# Patient Record
Sex: FEMALE | URBAN - METROPOLITAN AREA
[De-identification: names, ages, dates, MRNs, and addresses within clinical notes are randomized per-mention and may not be internally consistent; named-entity substitution may affect disease eponyms.]

---

## 2023-10-03 ENCOUNTER — APPOINTMENT (OUTPATIENT)
Dept: URBAN - METROPOLITAN AREA CLINIC 197 | Age: 48
Setting detail: DERMATOLOGY
End: 2023-10-05

## 2023-10-03 DIAGNOSIS — L40.0 PSORIASIS VULGARIS: ICD-10-CM

## 2023-10-03 PROCEDURE — OTHER PRESCRIPTION MEDICATION MANAGEMENT: OTHER

## 2023-10-03 PROCEDURE — OTHER DERMATOPHYTE TEST MEDIUM (DTM): OTHER

## 2023-10-03 PROCEDURE — 87101 SKIN FUNGI CULTURE: CPT

## 2023-10-03 PROCEDURE — 99214 OFFICE O/P EST MOD 30 MIN: CPT

## 2023-10-03 PROCEDURE — OTHER PRESCRIPTION: OTHER

## 2023-10-03 PROCEDURE — OTHER MEDICATION COUNSELING: OTHER

## 2023-10-03 PROCEDURE — OTHER MIPS QUALITY: OTHER

## 2023-10-03 PROCEDURE — OTHER COUNSELING: OTHER

## 2023-10-03 RX ORDER — PREDNISONE 10 MG/1
TABLET ORAL
Qty: 60 | Refills: 0 | Status: ERX | COMMUNITY
Start: 2023-10-03

## 2023-10-03 RX ORDER — FLUCONAZOLE 200 MG/1
TABLET ORAL
Qty: 6 | Refills: 0 | Status: ERX | COMMUNITY
Start: 2023-10-03

## 2023-10-03 ASSESSMENT — LOCATION SIMPLE DESCRIPTION DERM
LOCATION SIMPLE: ABDOMEN
LOCATION SIMPLE: LEFT BREAST

## 2023-10-03 ASSESSMENT — LOCATION DETAILED DESCRIPTION DERM
LOCATION DETAILED: LEFT INFRAMAMMARY CREASE (INNER QUADRANT)
LOCATION DETAILED: PERIUMBILICAL SKIN
LOCATION DETAILED: RIGHT RIB CAGE

## 2023-10-03 ASSESSMENT — LOCATION ZONE DERM: LOCATION ZONE: TRUNK

## 2023-10-03 NOTE — PROCEDURE: PRESCRIPTION MEDICATION MANAGEMENT
Render In Strict Bullet Format?: No
Detail Level: Zone
Initiate Treatment: prednisone 10 mg tablet \\nTake 3 po q AM x 10 days, then take 2 po q AM x 10 days, then take 1 po q AM x 10 days\\n\\nDiflucan 200 mg tablet \\nTake 1 Po QD x3 days, then 1 PO Q week x3 weeks

## 2023-10-03 NOTE — PROCEDURE: MEDICATION COUNSELING
Spoke with patient and girlfriend at bedside. Parents aware and on their way. Pt aware parent needs to arrive to ED asap due to pt is a minor. Per pt and girlfriend, pt will be here. Girlfriend states her ride is on their way as well.    Solaraze Counseling:  I discussed with the patient the risks of Solaraze including but not limited to erythema, scaling, itching, weeping, crusting, and pain.

## 2023-10-03 NOTE — HPI: RASH
How Severe Is Your Rash?: moderate
Is This A New Presentation, Or A Follow-Up?: Rash
Additional History: Thinks it started when she began using Lume deodorant in these areas

## 2023-11-07 ENCOUNTER — APPOINTMENT (OUTPATIENT)
Dept: URBAN - METROPOLITAN AREA CLINIC 197 | Age: 48
Setting detail: DERMATOLOGY
End: 2023-11-09

## 2023-11-07 DIAGNOSIS — R21 RASH AND OTHER NONSPECIFIC SKIN ERUPTION: ICD-10-CM

## 2023-11-07 DIAGNOSIS — L40.0 PSORIASIS VULGARIS: ICD-10-CM

## 2023-11-07 PROCEDURE — 99214 OFFICE O/P EST MOD 30 MIN: CPT

## 2023-11-07 PROCEDURE — OTHER MEDICATION COUNSELING: OTHER

## 2023-11-07 PROCEDURE — OTHER OTHER: OTHER

## 2023-11-07 PROCEDURE — OTHER PRESCRIPTION: OTHER

## 2023-11-07 PROCEDURE — OTHER LAB REPORTS REVIEWED: OTHER

## 2023-11-07 PROCEDURE — OTHER PRESCRIPTION MEDICATION MANAGEMENT: OTHER

## 2023-11-07 PROCEDURE — OTHER COUNSELING: OTHER

## 2023-11-07 PROCEDURE — OTHER PHOTO-DOCUMENTATION: OTHER

## 2023-11-07 PROCEDURE — OTHER MIPS QUALITY: OTHER

## 2023-11-07 RX ORDER — CLOBETASOL PROPIONATE 0.5 MG/ML
SOLUTION TOPICAL
Qty: 50 | Refills: 0 | Status: ERX | COMMUNITY
Start: 2023-11-07

## 2023-11-07 RX ORDER — FLUCONAZOLE 200 MG/1
TABLET ORAL
Qty: 15 | Refills: 0 | Status: ERX

## 2023-11-07 RX ORDER — TRIAMCINOLONE ACETONIDE 1 MG/G
OINTMENT TOPICAL
Qty: 80 | Refills: 0 | Status: ERX | COMMUNITY
Start: 2023-11-07

## 2023-11-07 RX ORDER — PREDNISONE 10 MG/1
TABLET ORAL
Qty: 60 | Refills: 0 | Status: ERX

## 2023-11-07 ASSESSMENT — LOCATION SIMPLE DESCRIPTION DERM
LOCATION SIMPLE: LEFT BREAST
LOCATION SIMPLE: INFERIOR FOREHEAD
LOCATION SIMPLE: RIGHT CHEEK
LOCATION SIMPLE: ABDOMEN

## 2023-11-07 ASSESSMENT — LOCATION DETAILED DESCRIPTION DERM
LOCATION DETAILED: RIGHT MEDIAL MALAR CHEEK
LOCATION DETAILED: INFERIOR MID FOREHEAD
LOCATION DETAILED: LEFT INFRAMAMMARY CREASE (INNER QUADRANT)
LOCATION DETAILED: PERIUMBILICAL SKIN

## 2023-11-07 ASSESSMENT — BSA PSORIASIS: % BODY COVERED IN PSORIASIS: 15

## 2023-11-07 ASSESSMENT — ITCH NUMERIC RATING SCALE: HOW SEVERE IS YOUR ITCHING?: 5

## 2023-11-07 ASSESSMENT — LOCATION ZONE DERM
LOCATION ZONE: FACE
LOCATION ZONE: TRUNK

## 2023-11-07 ASSESSMENT — PGA PSORIASIS: PGA PSORIASIS 2020: MILD

## 2023-11-07 NOTE — PROCEDURE: MEDICATION COUNSELING
28-Aug-2018 Thalidomide Counseling: I discussed with the patient the risks of thalidomide including but not limited to birth defects, anxiety, weakness, chest pain, dizziness, cough and severe allergy.

## 2023-11-07 NOTE — PROCEDURE: OTHER
Render Risk Assessment In Note?: no
Note Text (......Xxx Chief Complaint.): This diagnosis correlates with the
Other (Free Text): All ddx would be expected to last for greater than 1 year and are chronic conditions.
Detail Level: Zone

## 2023-11-07 NOTE — PROCEDURE: PRESCRIPTION MEDICATION MANAGEMENT
Render In Strict Bullet Format?: No
Detail Level: Zone
Initiate Treatment: triamcinolone acetonide 0.1 % topical ointment \\nApply bid to psoriasis prn\\n\\nclobetasol 0.05 % scalp solution \\nApply bid to scalp PRN rash
Initiate Treatment: prednisone 10 mg tablet \\nTake 3 po q AM x 10 days, then take 2 po q AM x 10 days, then take 1 po q AM x 10 days\\n\\nDiflucan 200 mg tablet \\nTake 1 tablet po qd x3 days then take 1 tablet po q week
Plan: Discussed treating for Demodex first would be most effective option, however understands trial and error. Pt unable to f/u until January due to being unable to take time off from work and since we are heading into holidays, we decided on the above tx. Can consider Elimite in the future.

## 2024-03-18 NOTE — PROCEDURE: MEDICATION COUNSELING
noted   Stelara Counseling:  I discussed with the patient the risks of ustekinumab including but not limited to immunosuppression, malignancy, posterior leukoencephalopathy syndrome, and serious infections.  The patient understands that monitoring is required including a PPD at baseline and must alert us or the primary physician if symptoms of infection or other concerning signs are noted.

## 2024-10-09 NOTE — PROCEDURE: MEDICATION COUNSELING
Detail Level: Detailed Size Of Lesion: 10cm erythema Dapsone Pregnancy And Lactation Text: This medication is Pregnancy Category C and is not considered safe during pregnancy or breast feeding.